# Patient Record
Sex: MALE | Race: WHITE | NOT HISPANIC OR LATINO | Employment: UNEMPLOYED | URBAN - METROPOLITAN AREA
[De-identification: names, ages, dates, MRNs, and addresses within clinical notes are randomized per-mention and may not be internally consistent; named-entity substitution may affect disease eponyms.]

---

## 2022-07-31 ENCOUNTER — APPOINTMENT (EMERGENCY)
Dept: RADIOLOGY | Facility: HOSPITAL | Age: 22
End: 2022-07-31
Payer: COMMERCIAL

## 2022-07-31 ENCOUNTER — HOSPITAL ENCOUNTER (EMERGENCY)
Facility: HOSPITAL | Age: 22
Discharge: HOME | End: 2022-07-31
Attending: EMERGENCY MEDICINE | Admitting: EMERGENCY MEDICINE
Payer: COMMERCIAL

## 2022-07-31 VITALS
HEIGHT: 72 IN | HEART RATE: 94 BPM | BODY MASS INDEX: 25.77 KG/M2 | SYSTOLIC BLOOD PRESSURE: 140 MMHG | DIASTOLIC BLOOD PRESSURE: 88 MMHG | RESPIRATION RATE: 20 BRPM | WEIGHT: 190.26 LBS | OXYGEN SATURATION: 98 % | TEMPERATURE: 98.6 F

## 2022-07-31 DIAGNOSIS — S92.533B: Primary | ICD-10-CM

## 2022-07-31 PROBLEM — S92.501B: Status: ACTIVE | Noted: 2022-07-31

## 2022-07-31 PROCEDURE — 73630 X-RAY EXAM OF FOOT: CPT

## 2022-07-31 PROCEDURE — 96375 TX/PRO/DX INJ NEW DRUG ADDON: CPT

## 2022-07-31 PROCEDURE — 90715 TDAP VACCINE 7 YRS/> IM: CPT | Performed by: EMERGENCY MEDICINE

## 2022-07-31 PROCEDURE — 99285 EMERGENCY DEPT VISIT HI MDM: CPT | Performed by: EMERGENCY MEDICINE

## 2022-07-31 PROCEDURE — 96365 THER/PROPH/DIAG IV INF INIT: CPT

## 2022-07-31 PROCEDURE — 90471 IMMUNIZATION ADMIN: CPT

## 2022-07-31 PROCEDURE — 99283 EMERGENCY DEPT VISIT LOW MDM: CPT

## 2022-07-31 RX ORDER — CEPHALEXIN 500 MG/1
500 CAPSULE ORAL EVERY 6 HOURS SCHEDULED
Status: CANCELLED | OUTPATIENT
Start: 2022-07-31

## 2022-07-31 RX ORDER — HYDROMORPHONE HCL/PF 1 MG/ML
0.5 SYRINGE (ML) INJECTION ONCE
Status: COMPLETED | OUTPATIENT
Start: 2022-07-31 | End: 2022-07-31

## 2022-07-31 RX ORDER — CEFAZOLIN SODIUM 2 G/50ML
2000 SOLUTION INTRAVENOUS ONCE
Status: COMPLETED | OUTPATIENT
Start: 2022-07-31 | End: 2022-07-31

## 2022-07-31 RX ORDER — IBUPROFEN 400 MG/1
600 TABLET ORAL EVERY 6 HOURS PRN
Qty: 100 TABLET | Refills: 0 | Status: SHIPPED | OUTPATIENT
Start: 2022-07-31

## 2022-07-31 RX ORDER — ACETAMINOPHEN 325 MG/1
650 TABLET ORAL EVERY 6 HOURS PRN
Qty: 100 TABLET | Refills: 0 | Status: SHIPPED | OUTPATIENT
Start: 2022-07-31

## 2022-07-31 RX ORDER — LIDOCAINE HYDROCHLORIDE 10 MG/ML
5 INJECTION, SOLUTION EPIDURAL; INFILTRATION; INTRACAUDAL; PERINEURAL ONCE
Status: COMPLETED | OUTPATIENT
Start: 2022-07-31 | End: 2022-07-31

## 2022-07-31 RX ORDER — CEPHALEXIN 500 MG/1
500 CAPSULE ORAL EVERY 8 HOURS SCHEDULED
Qty: 21 CAPSULE | Refills: 0 | Status: SHIPPED | OUTPATIENT
Start: 2022-07-31 | End: 2022-08-07

## 2022-07-31 RX ADMIN — LIDOCAINE HYDROCHLORIDE 5 ML: 10 INJECTION, SOLUTION EPIDURAL; INFILTRATION; INTRACAUDAL; PERINEURAL at 20:17

## 2022-07-31 RX ADMIN — CEFAZOLIN SODIUM 2000 MG: 2 SOLUTION INTRAVENOUS at 18:15

## 2022-07-31 RX ADMIN — TETANUS TOXOID, REDUCED DIPHTHERIA TOXOID AND ACELLULAR PERTUSSIS VACCINE, ADSORBED 0.5 ML: 5; 2.5; 8; 8; 2.5 SUSPENSION INTRAMUSCULAR at 18:15

## 2022-07-31 RX ADMIN — HYDROMORPHONE HYDROCHLORIDE 0.5 MG: 1 INJECTION, SOLUTION INTRAMUSCULAR; INTRAVENOUS; SUBCUTANEOUS at 18:15

## 2022-07-31 RX ADMIN — LIDOCAINE HYDROCHLORIDE 5 ML: 10 INJECTION, SOLUTION EPIDURAL; INFILTRATION; INTRACAUDAL; PERINEURAL at 19:52

## 2022-07-31 NOTE — ED PROVIDER NOTES
History  Chief Complaint   Patient presents with   [address] [place], [place] [address] Injury     Patient was deadlifting in power lifting competition and dropped approx  500 lb on his right toes (1st-3rd)     HPI    21-year-old male, otherwise healthy, presenting for evaluation of a right foot injury  Patient was in a power lip in competition when he dropped approximately 500 lb on his right foot  He has pain in the 1st, 2nd, 3rd digits of the right foot  Denies any other injuries  Patient is unsure of his last tetanus shot  None       History reviewed  No pertinent past medical history  Past Surgical History:   Procedure Laterality Date    SHOULDER SURGERY         History reviewed  No pertinent family history  I have reviewed and agree with the history as documented  E-Cigarette/Vaping     E-Cigarette/Vaping Substances     Social History     Tobacco Use    Smoking status: Never Smoker    Smokeless tobacco: Never Used   Substance Use Topics    Alcohol use: Not Currently    Drug use: Never        Review of Systems   Constitutional: Negative for chills and fever  HENT: Negative for sore throat  Respiratory: Negative for cough and shortness of breath  Cardiovascular: Negative for chest pain, palpitations and leg swelling  Gastrointestinal: Negative for abdominal pain, diarrhea, nausea and vomiting  Genitourinary: Negative for dysuria and frequency  Musculoskeletal: Positive for gait problem  Negative for myalgias  Skin: Positive for wound  Negative for rash  Neurological: Negative for dizziness, light-headedness and headaches  All other systems reviewed and are negative        Physical Exam  ED Triage Vitals [07/31/22 1756]   Temperature Pulse Respirations Blood Pressure SpO2   98 6 °F (37 °C) 92 20 133/70 99 %      Temp Source Heart Rate Source Patient Position - Orthostatic VS BP Location FiO2 (%)   Oral Monitor Lying Left arm --      Pain Score       7             Orthostatic Vital Signs  Vitals: 07/31/22 1756 07/31/22 1800   BP: 133/70 140/88   Pulse: 92 94   Patient Position - Orthostatic VS: Lying Lying       Physical Exam  Vitals and nursing note reviewed  Constitutional:       General: He is not in acute distress  Appearance: Normal appearance  He is not ill-appearing or toxic-appearing  HENT:      Head: Normocephalic and atraumatic  Right Ear: External ear normal       Left Ear: External ear normal       Nose: Nose normal       Mouth/Throat:      Mouth: Mucous membranes are moist       Pharynx: Oropharynx is clear  Eyes:      General: No scleral icterus  Extraocular Movements: Extraocular movements intact  Cardiovascular:      Rate and Rhythm: Normal rate  Pulses: Normal pulses  Pulmonary:      Effort: Pulmonary effort is normal  No respiratory distress  Abdominal:      General: There is no distension  Musculoskeletal:         General: Tenderness (1st, 2nd, 3rd dgitis of right foot) and signs of injury (open wound to the 2nd digit) present  Normal range of motion  Cervical back: Normal range of motion and neck supple  Skin:     General: Skin is warm  Capillary Refill: Capillary refill takes less than 2 seconds  Neurological:      General: No focal deficit present  Mental Status: He is alert and oriented to person, place, and time     Psychiatric:         Mood and Affect: Mood normal          ED Medications  Medications   HYDROmorphone (DILAUDID) injection 0 5 mg (0 5 mg Intravenous Given 7/31/22 1815)   ceFAZolin (ANCEF) IVPB (premix in dextrose) 2,000 mg 50 mL (0 mg Intravenous Stopped 7/31/22 1843)   tetanus-diphtheria-acellular pertussis (BOOSTRIX) IM injection 0 5 mL (0 5 mL Intramuscular Given 7/31/22 1815)   lidocaine (PF) (XYLOCAINE-MPF) 1 % injection 5 mL (5 mL Infiltration Given by Other 7/31/22 1952)   lidocaine (PF) (XYLOCAINE-MPF) 1 % injection 5 mL (5 mL Infiltration Given by Other 7/31/22 2017)       Diagnostic Studies  Results Reviewed None                 XR foot 3+ views RIGHT   Final Result by Stevie De Jesus MD (08/01 9695)      Suspected acute nondisplaced avulsion fragment at the tip of the second distal phalanx            Workstation performed: [id number]               Procedures  Procedures      ED Course                                       MDM  Number of Diagnoses or Management Options  Open fracture of distal phalanx of toe  Diagnosis management comments: 53-year-old male presenting for evaluation of right foot injury  Patient has tenderness palpation of the 1st 2nd and 3rd digits of the right foot and an open wound to the 2nd digit of the right foot  Suspected open fracture  Will get x-rays, update tetanus, start Ancef, likely consult Podiatry  X-ray shows a distal phalanx fracture of the 2nd digit  Podiatry was consulted  Podiatry evaluated the patient, repaired the laceration and placed patient in surgical shoe  Patient stable for discharge with outpatient podiatry follow up, keflex x 7 days, pain control as needed, return to ED precautions  I reviewed all testing with the patient: xray  I gave oral return precautions for what to return for in addition to the written return precautions  The patient (and any family present: mother, blainee) verbalized understanding of the discharge instructions and warnings that would necessitate return to the Emergency Department  I specifically highlighted areas of special concern regarding the written and verbal discharge instructions and return precautions  All questions were answered prior to discharge                  Disposition  Final diagnoses:   Open fracture of distal phalanx of toe     Time reflects when diagnosis was documented in both MDM as applicable and the Disposition within this note     Time User Action Codes Description Comment    7/31/2022  6:45 PM Arabella Pool Add [[id number]] Open fracture of distal phalanx of toe       ED Disposition     ED Disposition   Discharge    Condition   Stable    Date/Time   Sun Jul 31, 2022  9:11 PM    Adama discharge to home/self care  Follow-up Information     Follow up With Specialties Details Why Contact Info    Sylvie Nuñez DPM Podiatry, Wound Care Follow up  [address]  [address] [address] [address] [facility]  [phone]            Discharge Medication List as of 7/31/2022  9:13 PM      START taking these medications    Details   acetaminophen (TYLENOL) 325 mg tablet Take 2 tablets (650 mg total) by mouth every 6 (six) hours as needed for mild pain, Starting Sun 7/31/2022, Print      cephalexin (KEFLEX) 500 mg capsule Take 1 capsule (500 mg total) by mouth every 8 (eight) hours for 7 days, Starting Sun 7/31/2022, Until Sun 8/7/2022, Print      ibuprofen (MOTRIN) 400 mg tablet Take 1 5 tablets (600 mg total) by mouth every 6 (six) hours as needed for mild pain, Starting Sun 7/31/2022, Print           No discharge procedures on file  PDMP Review     None           ED Provider  Attending physically available and evaluated Shara Prabhakar I managed the patient along with the ED Attending      Electronically Signed by         Myriam Cristina,   07/31/22 1200 [facility], DO  08/02/22 1643

## 2022-07-31 NOTE — CONSULTS
Podiatry - Consultation    Patient Information:   Cherie Rocha 23 y o  male MRN: [id number]  Unit/Bed#: ED 03 Encounter: [id number]  PCP: No primary care provider on file  Date of Admission:  7/31/2022  Date of Consultation: 07/31/22  Requesting Physician: Kong Starks DO      ASSESSMENT:    Cherie Rocha is a 23 y o  male with:    1  Right 2nd toe distal phalanx open fracture  2  Laceration right 2nd toe    PLAN:    · Right 2nd digit lacerations sutured using 4-0 nylon, with dry sterile dressing applied  Patient stable for discharge and outpatient follow-up  · Avulsion fracture of right distal phalanx is non-displaced, not comminuted, and does not extend into joint space  Plan for conservative treatment, offloading with surgical shoe  · X-rays right foot: Nondisplaced fracture of 2nd digit distal phalanx  · 2g IV Ancef given in ED, patient discharged on Keflex 500mg TID x 5 days  · Ibuprofen 400mg q6hrs PRN for pain control  · Local wound care consisting of Xeroform and DSD  · Rest of care per primary team   · Will discuss this plan with my attending and update as needed  Procedure: Laceration repair right 2nd digit, with total nail avulsion  Verbal consent was obtained, and skin was sterilely prepped using Betadine swabs to the level of the digital sulcus of the right 2nd digit  8 mL of 1% lidocaine was then used to perform a local block of the right 2nd digit  Anesthesia to the digit was then assessed using 1-2 pickups  Attention was then directed to the right 2nd toe, and utilizing forceps, the right 2nd toenail was removed, to reveal underlying lacerations to nailbed  Wound was then flushed with copious amount of sterile saline via 5cc syringes  3 separate lacerations noted, measuring approximately 1 0cm, 0 8cm, and 0 5cm  After adequate irrigation, the skin edges of lacerations were re-approximated using 4-0 Nylon suture in both simple interrupted, and horizontal mattress technique  Once skin edges well coapted, a dressing was applied consisting of Xeroform gauze, sterile 4x4 gauze, and dave wrap  Procedure tolerated well by patient, without immediate complications  Weightbearing status: Weightbearing as tolerated to right foot    SUBJECTIVE:    History of Present Illness:    Ruthie Schreiber is a 23 y o  male who is originally admitted to the ED on 7/31/2022 due to open fracture right foot  Patient does not have a significant past medical history  We are consulted for open fracture of the distal phalanx of the right 2nd toe, with laceration of nail bed  Patient states that at approximately 5:00 p m  at a weightlifting competition he dropped a 500 lb weight onto his right foot  Immediately, he could not bear weight on the foot, and noticed there was an open wound to the right 2nd toe  He states that he is currently in 10/10 pain at this time  He is anxious during exam, and is worried that he will need surgery for his broken toe  Patient and family reassured, all questions and concerns addressed  Review of Systems:    Constitutional: Negative  HENT: Negative  Eyes: Negative  Respiratory: Negative  Cardiovascular: Negative  Gastrointestinal: Negative  Musculoskeletal: Fracture right 2nd toe   Skin:  Laceration right 2nd toe   Neurological:  Negative   Psych: Negative  Past Medical and Surgical History:     History reviewed  No pertinent past medical history      Past Surgical History:   Procedure Laterality Date    SHOULDER SURGERY         Meds/Allergies:    (Not in a hospital admission)      No Known Allergies    Social History:     Marital Status: Single    Substance Use History:   Social History     Substance and Sexual Activity   Alcohol Use Not Currently     Social History     Tobacco Use   Smoking Status Never Smoker   Smokeless Tobacco Never Used     Social History     Substance and Sexual Activity   Drug Use Never       Family History:    History reviewed  No pertinent family history  OBJECTIVE:    Vitals:   Blood Pressure: 140/88 (07/31/22 1800)  Pulse: 94 (07/31/22 1800)  Temperature: 98 6 °F (37 °C) (07/31/22 1756)  Temp Source: Oral (07/31/22 1756)  Respirations: 20 (07/31/22 1800)  Height: 6' (182 9 cm) (07/31/22 1756)  Weight - Scale: 86 3 kg (190 lb 4 1 oz) (07/31/22 1756)  SpO2: 98 % (07/31/22 1800)    Physical Exam:    Lower Extremity:    Vascular:   DP:  Pulses palpable bilaterally  PT:  Pulses palpable bilaterally  CRT < 3 seconds at the digits  Localized edema of the right 2nd digit  Pedal hair is present  Skin temperature is WNL bilaterally  Musculoskeletal:  MMT is 5/5 in all muscle compartments bilaterally  ROM at the 1st MPJ and ankle joint are within normal limits bilaterally with the leg extended  Pain on palpation of right 2nd toe  No gross deformity observed  Dermatological:  Right 2nd digit nail loosely attached, and distal tuft of skin avulsed from toe  3 separate lacerations of underlying nail bed, approximately 1 0cm, 0 8cm, and 0 5cm  Mild erythema and edema of the right 2nd digit  Neurological:  Gross sensation is intact  Light touch is intact  Protective sensation is intact  Additional data:     Lab Results: I have personally reviewed pertinent labs including:              Invalid input(s): LABALBU        Cultures: I have personally reviewed pertinent cultures including:              Imaging: I have personally reviewed pertinent reports in PACS  EKG, Pathology, and Other Studies: I have personally reviewed pertinent reports  Time Spent for Care: 30 minutes  More than 50% of total time spent on counseling and coordination of care as described above  ** Please Note: Portions of the record may have been created with voice recognition software  Occasional wrong word or "sound a like" substitutions may have occurred due to the inherent limitations of voice recognition software   Read the chart carefully and recognize, using context, where substitutions have occurred   **

## 2022-07-31 NOTE — ED NOTES
Dr Berny Shetty at the bedside for patient evaluation at this time     Sharon Gold, RN  07/31/22 1800

## 2022-08-01 NOTE — ED ATTENDING ATTESTATION
7/31/2022  ISeferino DO, saw and evaluated the patient  I have discussed the patient with the resident/non-physician practitioner and agree with the resident's/non-physician practitioner's findings, Plan of Care, and MDM as documented in the resident's/non-physician practitioner's note, except where noted  All available labs and Radiology studies were reviewed  I was present for key portions of any procedure(s) performed by the resident/non-physician practitioner and I was immediately available to provide assistance  At this point I agree with the current assessment done in the Emergency Department  I have conducted an independent evaluation of this patient a history and physical is as follows:    26-year-old male presents for right foot injury  The patient was at a paralytic competition and dropped 500 lb barbell weight is onto his right foot  He has a deformity of his 2nd toe with likely an open fracture as well as ecchymoses the 1st and 3rd toe  Significant pain  Known midfoot or forefoot pain  Could bear weight on his heel right after this incident  Tetanus is up-to-date no other modifying factors or associated symptoms moderate severity    Plan x-ray to determine extent of fracture IV antibiotics for open fracture likely consult Podiatry      ED Course         Critical Care Time  Procedures

## 2022-08-01 NOTE — DISCHARGE INSTRUCTIONS
Please use the following pain medications as prescribed:  - Tylenol 650mg every 6 hours  - Motrin 400mg every 6 hours  They work in different ways so can be used together at the same time  Discharge Instructions - Podiatry    Weight Bearing Status: Weight bearing as tolerated to the right foot  Pain: Continue analgesics as directed    Follow-up appointment instructions: Please make an appointment within one week of discharge with Dr Sriram Minaya  Contact sooner if any increase in pain, or signs of infection occur    Patient Wound Care Instructions: Leave dressings clean, dry, and intact until 1st outpatient office visit